# Patient Record
Sex: FEMALE | Race: WHITE
[De-identification: names, ages, dates, MRNs, and addresses within clinical notes are randomized per-mention and may not be internally consistent; named-entity substitution may affect disease eponyms.]

---

## 2017-07-03 ENCOUNTER — HOSPITAL ENCOUNTER (OUTPATIENT)
Dept: HOSPITAL 45 - C.LAB | Age: 52
Discharge: HOME | End: 2017-07-03
Attending: FAMILY MEDICINE
Payer: COMMERCIAL

## 2017-07-03 DIAGNOSIS — Z13.220: ICD-10-CM

## 2017-07-03 DIAGNOSIS — I10: Primary | ICD-10-CM

## 2017-07-03 LAB
ANION GAP SERPL CALC-SCNC: 5 MMOL/L (ref 3–11)
BUN SERPL-MCNC: 15 MG/DL (ref 7–18)
BUN/CREAT SERPL: 18.1 (ref 10–20)
CALCIUM SERPL-MCNC: 9 MG/DL (ref 8.5–10.1)
CHLORIDE SERPL-SCNC: 104 MMOL/L (ref 98–107)
CHOLEST/HDLC SERPL: 3 {RATIO}
CO2 SERPL-SCNC: 30 MMOL/L (ref 21–32)
CREAT SERPL-MCNC: 0.85 MG/DL (ref 0.6–1.2)
GLUCOSE SERPL-MCNC: 81 MG/DL (ref 70–99)
GLUCOSE UR QL: 67 MG/DL
KETONES UR QL STRIP: 108 MG/DL
NITRITE UR QL STRIP: 123 MG/DL (ref 0–150)
PH UR: 200 MG/DL (ref 0–200)
POTASSIUM SERPL-SCNC: 3.9 MMOL/L (ref 3.5–5.1)
SODIUM SERPL-SCNC: 139 MMOL/L (ref 136–145)
VERY LOW DENSITY LIPOPROT CALC: 25 MG/DL

## 2017-07-06 NOTE — CODING QUERY NO DIAGNOSIS
TREATMENT RENDERED WITHOUT A DIAGNOSIS                                                  



To promote full compliance with coding requirements relating to patient care, physician 
participation is requested in all cases of  uncertainty.  Please assist us with 
providing a diagnosis/symptom for the test(s) below:



A diagnosis/symptom was not documented on your Order.  A valid diagnosis/symptom is 
required to bill all insurances.



**Please remember that we are unable to code a diagnosis of rule out, probable, possible, 
questionable, or suspected.  



Tests that require a diagnosis:

DOS 7/3

* PRP      DIAGNOSIS:

* Lipids      DIAGNOSIS:





Provider Signature: _____________________________ Date: _________



Thank you  

Lisbeth Love

Health Information Management

Phone:  [phone]

Fax:  [phone]



Once completed, please kindly fax back to [phone]



For questions please call [phone]

## 2017-07-11 ENCOUNTER — HOSPITAL ENCOUNTER (OUTPATIENT)
Dept: HOSPITAL 45 - C.MAMM | Age: 52
Discharge: HOME | End: 2017-07-11
Attending: OBSTETRICS & GYNECOLOGY
Payer: COMMERCIAL

## 2017-07-11 DIAGNOSIS — Z12.31: Primary | ICD-10-CM

## 2017-07-12 NOTE — MAMMOGRAPHY REPORT
BILATERAL DIGITAL SCREENING MAMMOGRAM TOMOSYNTHESIS WITH CAD: 7/11/2017

CLINICAL HISTORY: Routine screening.  Patient has no complaints.  





TECHNIQUE:  Breast tomosynthesis in addition to standard 2D mammography was performed. Current study 
was also evaluated with a Computer Aided Detection (CAD) system.  



COMPARISON: Comparison is made to exams dated:  6/29/2016 mammogram, 6/25/2015 mammogram, 6/16/2015 m
ammogram, 6/11/2014 mammogram, 6/4/2013 mammogram, and 5/30/2012 mammogram - [facility]
nter.   



BREAST COMPOSITION:  The tissue of both breasts is heterogeneously dense, which may obscure small mas
ses.  



FINDINGS:  There is a 9 mm asymmetry in the lateral, middle to posterior right breast on the CC view.
  The corresponding tomosynthesis images are equivocal for a possible mass in this location.  Therefo
re, additional spot compression tomosynthesis views and possible ultrasound are recommended, although
 this could represent normal overlapping fibroglandular tissue.



There is a stable dominant 16 mm mass with associated coarse calcification in the central right breas
t.  A few benign rim calcifications and fluctuating nodularity in the left breast. No other suspiciou
s mass, architectural distortion or cluster of microcalcifications is seen.  



IMPRESSION:  ACR BI-RADS CATEGORY 0: INCOMPLETE EVALUATION:  NEED ADDITIONAL IMAGING EVALUATION

The 9 mm asymmetry in the lateral right breast needs additional evaluation.  

The patient will be called to schedule an appointment.  





Approximately 10% of breast cancers are not detected with mammography. A negative mammographic report
 should not delay biopsy if a clinically suggestive mass is present.



Diamond Browne M.D.          

ay/:7/11/2017 16:35:44  



Imaging Technologist: Selena KNOWLES(R)(M), [facility]

letter sent: Addl Imaging 0  

BI-RADS Code: ACR BI-RADS Category 0: Incomplete Evaluation:  Need Additional Imaging Evaluation

## 2017-07-27 ENCOUNTER — HOSPITAL ENCOUNTER (OUTPATIENT)
Dept: HOSPITAL 45 - C.MAMM | Age: 52
Discharge: HOME | End: 2017-07-27
Attending: FAMILY MEDICINE
Payer: COMMERCIAL

## 2017-07-27 DIAGNOSIS — N64.89: Primary | ICD-10-CM

## 2017-07-27 DIAGNOSIS — N60.01: ICD-10-CM

## 2017-07-27 NOTE — MAMMOGRAPHY REPORT
UNILATERAL RIGHT DIGITAL DIAGNOSTIC MAMMOGRAM TOMOSYNTHESIS AND TARGETED RIGHT ULTRASOUND: 7/27/2017

CLINICAL HISTORY: Callback from screening mammogram for right breast asymmetry.  





TECHNIQUE:  Breast tomosynthesis in addition to standard 2D mammography was performed.  Spot compress
ion right CC and MLO 2-D and tomosynthesis images were obtained.



COMPARISON: Comparison is made to exams dated:  7/11/2017 mammogram, 6/29/2016 mammogram, 6/16/2015 m
ammogram, 6/11/2014 mammogram, 6/4/2013 mammogram, and 5/30/2012 mammogram - [facility]
nter.   



BREAST COMPOSITION:  The tissue of the right breast is heterogeneously dense, which may obscure small
 masses.  



FINDINGS:  The previously described 9 mm asymmetry seen within the right lateral breast on the cc vie
w effaces on the spot compression view, without a suspicious mass or architectural distortion seen in
 this region on the tomosynthesis images.



Targeted ultrasound was performed of the right lateral breast in the region of the mammographic asymm
etry.  Multiple anechoic benign cysts and cyst clusters were seen in the right lateral breast during 
the ultrasound exam, including a 3 x 4 mm simple cyst in the right breast 8:00, 8 cm from the nipple,
 an 8 x 9 mm cyst in the right breast at 9:30, 6 cm from the nipple, a 3 mm cyst in the right breast 
at 11:00, 4 cm from the nipple, and an 11 x 7 mm cluster of cysts in the right 11:00 periareolar don
st.  The cyst in the right 9:30 breast may correspond with the mammographic asymmetry.  Alternatively
, the asymmetry could represent normal fibroglandular tissue.



IMPRESSION:  ACR BI-RADS CATEGORY 2: BENIGN, TARGETED ULTRASOUND ACR BI-RADS CATEGORY 2: BENIGN 

The right lateral breast asymmetry effaces on the additional views.  Multiple benign cysts and cyst c
lusters were seen within the right lateral breast on the ultrasound exam.  One 9 mm benign cyst in th
e right breast at 9:30 may correspond with the mammographic asymmetry.  Alternatively, the asymmetry 
may represent normal fibroglandular tissue.  There is no mammographic or targeted sonographic evidenc
e of malignancy. A 1 year screening mammogram is recommended.  The patient has been verbally notified
 of the results.  





Approximately 10% of breast cancers are not detected with mammography. A negative mammographic report
 should not delay biopsy if a clinically suggestive mass is present.



Lovely Arredondo M.D.          

ah/:7/27/2017 08:41:59  



Imaging Technologist: Rupinder KNOWLES(SEBAS)(CHRIS), [facility]

letter sent: Normal 1/2  

BI-RADS Code: ACR BI-RADS Category 2: Benign  Ultrasound BI-RADS: ACR BI-RADS Category 2: Benign

## 2017-08-29 ENCOUNTER — HOSPITAL ENCOUNTER (OUTPATIENT)
Dept: HOSPITAL 45 - C.CPL | Age: 52
Discharge: HOME | End: 2017-08-29
Attending: SURGERY
Payer: COMMERCIAL

## 2017-08-29 DIAGNOSIS — R22.9: Primary | ICD-10-CM

## 2017-09-26 ENCOUNTER — HOSPITAL ENCOUNTER (OUTPATIENT)
Dept: HOSPITAL 45 - X.SURG | Age: 52
Discharge: HOME | End: 2017-09-26
Attending: SURGERY
Payer: COMMERCIAL

## 2017-09-26 VITALS
HEIGHT: 64.02 IN | HEIGHT: 64.02 IN | WEIGHT: 131.29 LBS | BODY MASS INDEX: 22.41 KG/M2 | BODY MASS INDEX: 22.41 KG/M2 | WEIGHT: 131.29 LBS

## 2017-09-26 VITALS — OXYGEN SATURATION: 100 % | HEART RATE: 75 BPM | SYSTOLIC BLOOD PRESSURE: 125 MMHG | DIASTOLIC BLOOD PRESSURE: 57 MMHG

## 2017-09-26 VITALS — TEMPERATURE: 97.34 F

## 2017-09-26 DIAGNOSIS — C82.92: Primary | ICD-10-CM

## 2017-09-26 NOTE — [facility] OPERATIVE REPORT
Operative Report


Operative Date


Sep 26, 2017.





Pre-Operative Diagnosis





Chest Wall Mass





Post-Operative Diagnosis





Sebaceous Cyst





Procedure(s) Performed





Excision of Subcutaneous Mass





Surgeon


Dr. KELLE Bauer





Assistant Surgeon(s)


ALEKSEY Roth PA-C





Estimated Blood Loss


10 cc





Findings


4 cm mass- likely seb cyst, inflammation





Specimens





A.  Chest Wall Mass





Anesthesia


local/ sedation





Complication(s)


None





Disposition


Recovery Room / PACU


I attest to the content of the Intraoperative Record and any orders documented 

therein.  Any exceptions are noted below.

## 2017-09-26 NOTE — DISCHARGE INSTRUCTIONS-[facility]
Discharge Instructions


Date of Service


Sep 26, 2017.





Visit


Reason for Visit:  Chest Wall Mass





Discharge


Discharge Diagnosis / Problem:  soft tissue mass





Discharge Goals


Goal(s):  Decrease discomfort, Improve function, Improve disease control





Activity Recommendations


Activity Limitations:  as noted below


Lifting Limitations:  no more than 25 pounds


Exercise/Sports Limitations:  until after follow-up appointment


May Resume Sexual Activity:  when tolerated


Shower/Bathe:  keep incision dry (may shower over incision in 2 days- Thur 9/28)


Driving or Machine Use:  resume 1 day after discharge





SPECIAL CARE INSTRUCTIONS:





*  Cover incisions and change daily for comfort/drainage.





*  May use ibuprofen for pain as tolerated.





*  Expect some swelling and bruising.





Call your doctor if:





*  Temperature above 101 degrees





*  Pain not relieved by pain medicine ordered





*  There is increased drainage or redness from any incision





*  You have any unanswered questions or concerns [phone].








FOLLOW UP VISIT:





If not already scheduled, please call the office for a follow-up visit.





for 2 weeks- check up





OFFICE PHONE NUMBER:





Dr. Bauer Office Phone Number:  [phone]








Anesthesia


.





Post Anesthesia Instructions:





If you have had General Anesthesia or IV Sedation:





*  Do not drive today.


*  Resume driving when surgeon permits.


*  Do not make important decisions or sign legal documents today.


*  Call surgeon for:





   1.  Temperature elevations greater than 101 degrees F.


   2.  Uncontrollable pain.


   3.  Excessive bleeding.


   4.  Persistent nausea and vomiting.


   5.  Medication intolerance (nausea, vomiting or rash).





*  For nausea and vomiting use only clear liquids such as: tea, soda, bouillon 

until nausea subsides, then gradually increase diet as tolerated.





*  If you have any concerns or questions, call your surgeon's office.  If 

physician is unavailable and it is an emergency, call 911 or go to the nearest 

emergency room.





.





Diet Recommendations


Home Diet:  resume previous diet





Pending Studies


Studies pending at discharge:  no





Medical Emergencies








.


Who to Call and When:





Medical Emergencies:  If at any time you feel your situation is an emergency, 

please call 911 immediately.





.





Non-Emergent Contact


Non-Emergency issues call your:  Primary Care Provider, Surgeon





.


.








"Provider Documentation" section prepared by Leandro Bauer.








.

## 2017-09-26 NOTE — ANESTHESIA PROGRESS NT - [facility]
Anesthesia Post Op Note


Date & Time


Sep 26, 2017 at 08:18





Vital Signs


Pain Intensity:  2





Vital Signs Past 12 Hours








  Date Time  Temp Pulse Resp B/P (MAP) Pulse Ox O2 Delivery O2 Flow Rate FiO2


 


9/26/17 08:08  75 18 125/57 (79) 100 Room Air  


 


9/26/17 07:41 36.3 83 16 102/66 (78) 99 Room Air  


 


9/26/17 06:37 36.4 85 22 113/76 (88) 98 Room Air  











Notes


Mental Status:  alert / awake / arousable, participated in evaluation


Pt Amnestic to Procedure:  Yes


Nausea / Vomiting:  adequately controlled


Pain:  adequately controlled


Airway Patency, RR, SpO2:  stable & adequate


BP & HR:  stable & adequate


Hydration State:  stable & adequate


Anesthetic Complications:  no major complications apparent

## 2017-09-26 NOTE — ANESTHESIOLOGY PROGRESS NOTE
Anesthesia Post Op Note


Date & Time


Sep 26, 2017 at 08:04





Vital Signs


Pain Intensity:  0





Vital Signs Past 12 Hours








  Date Time  Temp Pulse Resp B/P (MAP) Pulse Ox O2 Delivery O2 Flow Rate FiO2


 


9/26/17 07:41 36.3 83 16 102/66 (78) 99 Room Air  


 


9/26/17 06:37 36.4 85 22 113/76 (88) 98 Room Air  











Notes


Mental Status:  alert / awake / arousable, participated in evaluation


Pt Amnestic to Procedure:  Yes


Nausea / Vomiting:  adequately controlled


Pain:  adequately controlled


Airway Patency, RR, SpO2:  stable & adequate


BP & HR:  stable & adequate


Hydration State:  stable & adequate


Anesthetic Complications:  no major complications apparent

## 2017-09-26 NOTE — OPERATIVE REPORT
DATE OF OPERATION:  09/26/2017

 

NAME OF OPERATION:  Excision of 4 cm subcutaneous mass.

 

PREOPERATIVE DIAGNOSIS:  Sebaceous cyst versus lipoma.

 

POSTOPERATIVE DIAGNOSIS:  Sebaceous cyst.

 

STAFF SURGEON:  Dr. Bauer.

 

ASSISTANT:  Barbara Roth PA-C.

 

ANESTHESIA:  Local with sedation.

 

PROCEDURE:  The patient was brought in the operating room and placed on the

operating table in supine position.  Her lower chest, upper abdomen was

prepped and draped in usual fashion.  She had an area of mass which was

approximately 4 x 2 cm palpable with some mild erythema.  Elliptical incision

was made carrying dissection down identifying what appeared to be a sebaceous

cyst, but there was surrounding inflammation.  This was excised down into the

subcutaneous tissue.  After appropriate hemostasis, the deep tissue was

reapproximated using 3-0 Vicryl suture, then the skin reapproximated using

subcuticular 4-0 Monocryl and Steri-Strips.  Dressing applied and patient

transferred to recovery room in stable condition.

 

 

I attest to the content of the Intraoperative Record and any orders documented therein. Any exception
s are noted below.

## 2017-10-17 ENCOUNTER — HOSPITAL ENCOUNTER (OUTPATIENT)
Dept: HOSPITAL 45 - C.LAB | Age: 52
Discharge: HOME | End: 2017-10-17
Attending: INTERNAL MEDICINE
Payer: COMMERCIAL

## 2017-10-17 DIAGNOSIS — C82.19: Primary | ICD-10-CM

## 2017-10-17 LAB
ALBUMIN/GLOB SERPL: 1.1 {RATIO} (ref 0.9–2)
ALP SERPL-CCNC: 88 U/L (ref 45–117)
ALT SERPL-CCNC: 23 U/L (ref 12–78)
ANION GAP SERPL CALC-SCNC: 6 MMOL/L (ref 3–11)
AST SERPL-CCNC: 16 U/L (ref 15–37)
BASOPHILS # BLD: 0.04 K/UL (ref 0–0.2)
BASOPHILS NFR BLD: 0.5 %
BUN SERPL-MCNC: 11 MG/DL (ref 7–18)
BUN/CREAT SERPL: 14.5 (ref 10–20)
CALCIUM SERPL-MCNC: 8.7 MG/DL (ref 8.5–10.1)
CHLORIDE SERPL-SCNC: 104 MMOL/L (ref 98–107)
CO2 SERPL-SCNC: 30 MMOL/L (ref 21–32)
COMPLETE: YES
CREAT SERPL-MCNC: 0.77 MG/DL (ref 0.6–1.2)
EOSINOPHIL NFR BLD AUTO: 276 K/UL (ref 130–400)
GLOBULIN SER-MCNC: 3.4 GM/DL (ref 2.5–4)
GLUCOSE SERPL-MCNC: 95 MG/DL (ref 70–99)
HCT VFR BLD CALC: 39.6 % (ref 37–47)
IG%: 0.1 %
IMM GRANULOCYTES NFR BLD AUTO: 29.1 %
LYMPHOCYTES # BLD: 2.24 K/UL (ref 1.2–3.4)
MCH RBC QN AUTO: 30.8 PG (ref 25–34)
MCHC RBC AUTO-ENTMCNC: 33.6 G/DL (ref 32–36)
MCV RBC AUTO: 91.7 FL (ref 80–100)
MONOCYTES NFR BLD: 10.5 %
NEUTROPHILS # BLD AUTO: 3 %
NEUTROPHILS NFR BLD AUTO: 56.8 %
PMV BLD AUTO: 9.5 FL (ref 7.4–10.4)
POTASSIUM SERPL-SCNC: 3.6 MMOL/L (ref 3.5–5.1)
RBC # BLD AUTO: 4.32 M/UL (ref 4.2–5.4)
SODIUM SERPL-SCNC: 140 MMOL/L (ref 136–145)
WBC # BLD AUTO: 7.69 K/UL (ref 4.8–10.8)

## 2017-10-25 ENCOUNTER — HOSPITAL ENCOUNTER (OUTPATIENT)
Dept: HOSPITAL 45 - C.PET | Age: 52
Discharge: HOME | End: 2017-10-25
Attending: INTERNAL MEDICINE
Payer: COMMERCIAL

## 2017-10-25 DIAGNOSIS — C82.19: Primary | ICD-10-CM

## 2017-10-25 NOTE — DIAGNOSTIC IMAGING REPORT
WHOLE BODY PET/CT



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



COMPARISON STUDY: No priors.



TECHNIQUE: One hour following the IV administration of 15.99 mCi of F-18 FDG,

PET/CT examination was performed from the orbital meatal line through the feet.

Noncontrast CT is performed for the purposes of anatomic correlation and

attenuation correction. Note that this does not reflect a diagnostic CT

examination. Images were reviewed on a separate [employer] independent workstation.

Fused images were obtained. Standard uptake values reported are maximum values

within the region of interest expressed in gm/mL.



FINDINGS:



PET FINDINGS:



Head and neck: There is expected physiologic activity within the visualized

brain parenchyma at the skull base and the salivary glands. Low level pharyngeal

activity as well as nonspecific activity localizing to the left thyroid

cartilage in image #38 is likely within physiologic limits. There are no

pathologically enlarged or FDG avid cervical lymph nodes.



Thorax: Evaluation of the thorax demonstrates expected physiologic myocardial

activity. There is a 1.6 cm calcification containing nodule in the right breast

seen on image #98. The appearance is typical for a fibroadenoma. This was not

demonstrably FDG avid. There are no pathologically enlarged or FDG avid

mediastinal, hilar, or axillary lymph nodes. There is mild dermal thickening and

induration involving the ventral midline lower chest/upper abdominal wall, which

extends to a depth of 8 mm. This is located just below the xiphoid of the

sternum on axial image #115. This is FDG avid with a maximum SUV of 2.6.



Abdomen and pelvis: There is expected activity within the liver, spleen,

kidneys, renal collecting system, and bladder. Low-level bowel activity is

likely within physical limits. There is no abdominal or pelvic lymphadenopathy.

The spleen is normal in size, measuring 10 cm in length.



Lower extremities: There is no inguinal lymphadenopathy. No abnormal dermal

thickening or FDG activity seen in the legs.



Unenhanced CT images: Visualized brain parenchyma at the skull base is normal in

appearance. The orbital contents are normal as visualized. The imaged paranasal

sinuses are clear. There is a right mastoid effusion. The salivary and thyroid

glands are within normal limits. The thoracic aorta is normal in caliber. The

heart is normal in size and without pericardial effusion. The lungs and pleural

spaces are clear. The gallbladder surgically absent. The unenhanced liver,

spleen, adrenal glands, pancreas, and kidneys are grossly normal. The abdominal

aorta is normal in course and caliber. There is no bowel obstruction. There is

no intraperitoneal free air or abdominal ascites. The bladder is normal in

appearance. The uterus is surgically absent. No adnexal lesion is seen. The

lower extremity soft tissues are normal in appearance. Chronic posttraumatic

deformity is seen in the left ischio. No destructive bony lesion is seen.





IMPRESSION:



1. There is dermal thickening with associated FDG activity seen involving the

soft tissues in the ventral midline lower chest/upper abdomen. This is

consistent with the reported history of follicular lymphoma of the dermis.



2. No additional sites of dermal disease are identified.



3. There are no pathologically enlarged lymph nodes seen. The spleen is normal

in size.



4. The lungs are clear.



5. Additional findings as above.







Electronically signed by:  Gautam Vasquez M.D.

10/25/2017 9:41 AM



Dictated Date/Time:  10/25/2017 9:22 AM

## 2017-12-26 ENCOUNTER — HOSPITAL ENCOUNTER (OUTPATIENT)
Dept: HOSPITAL 45 - C.LAB | Age: 52
Discharge: HOME | End: 2017-12-26
Attending: INTERNAL MEDICINE
Payer: COMMERCIAL

## 2017-12-26 DIAGNOSIS — C82.19: Primary | ICD-10-CM

## 2017-12-26 LAB
ALBUMIN SERPL-MCNC: 3.5 GM/DL (ref 3.4–5)
ALP SERPL-CCNC: 80 U/L (ref 45–117)
ALT SERPL-CCNC: 24 U/L (ref 12–78)
AST SERPL-CCNC: 16 U/L (ref 15–37)
BASOPHILS # BLD: 0.04 K/UL (ref 0–0.2)
BASOPHILS NFR BLD: 0.7 %
BUN SERPL-MCNC: 19 MG/DL (ref 7–18)
CALCIUM SERPL-MCNC: 8.6 MG/DL (ref 8.5–10.1)
CO2 SERPL-SCNC: 30 MMOL/L (ref 21–32)
CREAT SERPL-MCNC: 0.79 MG/DL (ref 0.6–1.2)
EOS ABS #: 0.16 K/UL (ref 0–0.5)
EOSINOPHIL NFR BLD AUTO: 271 K/UL (ref 130–400)
GLUCOSE SERPL-MCNC: 95 MG/DL (ref 70–99)
HCT VFR BLD CALC: 41.7 % (ref 37–47)
HGB BLD-MCNC: 13.8 G/DL (ref 12–16)
IG#: 0.02 K/UL (ref 0–0.02)
IMM GRANULOCYTES NFR BLD AUTO: 26.8 %
LYMPHOCYTES # BLD: 1.45 K/UL (ref 1.2–3.4)
MCH RBC QN AUTO: 30.7 PG (ref 25–34)
MCHC RBC AUTO-ENTMCNC: 33.1 G/DL (ref 32–36)
MCV RBC AUTO: 92.7 FL (ref 80–100)
MONO ABS #: 0.65 K/UL (ref 0.11–0.59)
MONOCYTES NFR BLD: 12 %
NEUT ABS #: 3.09 K/UL (ref 1.4–6.5)
NEUTROPHILS # BLD AUTO: 3 %
NEUTROPHILS NFR BLD AUTO: 57.1 %
PMV BLD AUTO: 10.1 FL (ref 7.4–10.4)
POTASSIUM SERPL-SCNC: 3.8 MMOL/L (ref 3.5–5.1)
PROT SERPL-MCNC: 7.1 GM/DL (ref 6.4–8.2)
RED CELL DISTRIBUTION WIDTH CV: 12.4 % (ref 11.5–14.5)
RED CELL DISTRIBUTION WIDTH SD: 42.4 FL (ref 36.4–46.3)
SODIUM SERPL-SCNC: 139 MMOL/L (ref 136–145)
WBC # BLD AUTO: 5.41 K/UL (ref 4.8–10.8)

## 2018-01-23 ENCOUNTER — HOSPITAL ENCOUNTER (OUTPATIENT)
Dept: HOSPITAL 45 - C.ONC | Age: 53
Discharge: HOME | End: 2018-01-23
Attending: PHYSICIAN ASSISTANT
Payer: COMMERCIAL

## 2018-01-23 VITALS
HEART RATE: 96 BPM | DIASTOLIC BLOOD PRESSURE: 70 MMHG | OXYGEN SATURATION: 97 % | SYSTOLIC BLOOD PRESSURE: 115 MMHG | TEMPERATURE: 97.88 F

## 2018-01-23 DIAGNOSIS — Z85.72: ICD-10-CM

## 2018-01-23 DIAGNOSIS — Z92.3: ICD-10-CM

## 2018-01-23 DIAGNOSIS — Z08: Primary | ICD-10-CM

## 2018-01-23 NOTE — RADIATION ONCOLOGY FOLLOW-UP
Radiation Oncology Follow-Up


Date of Visit


Jan 23, 2018.





Reason For Visit


One-month follow-up





Radiation Completion Date


12/12/17





Diagnosis





(1) Cutaneous B-cell lymphoma


Status:  Resolved        Onset Date:  9/26/2017


Location:  upper abdominal wall


Stage:  l


Permanent Comment:  Development of a mass of the central lower chest


Increase in size and development of overlying redness August 2016


Status post partial excision


Status post wide excision 09/26/2017


Non-Hodgkin's B-cell lymphoma, follicular low-grade


Status post completion of radiation therapy to the abdominal wall 12/12/2017 

received 3600 cGy  Last Edited By: Mariella Sims on Dec 21, 2017 11:39





History of Present Illness


Ms. Moser  had a lump in her midline of her chest for several years that was 

slowly grown over time.  She states that the mass has became more red over 

time.  The patient was evaluated by Dr. Bauer in August 2017 who recommended an 

excisional biopsy.  The patient was brought to the operating room on 09/26/2017 

underwent an excisional biopsy by Dr. Bauer; Dr. Bauer described the mass as 

measuring 4 x 2 cm and with some mild erythema.  Pathology revealed primary 

cutaneous low-grade B cell follicular lymphoma..  The patient was seen by Dr. Tee Finley from medical oncology.  The patient then had a PET/CT scan 

completed on 10/25/2017 which revealed dermal thickening with associated FDG 

activity involving the soft tissues in the ventral midline lower chest/upper 

abdomen but no other evidence of additional sites of disease.  The patient was 

staged as stage I primary cutaneous low-grade lymphoma.  Dr. Finley 

recommended consideration of primary radiation therapy followed by close 

surveillance and monitoring.  We are now seeing the patient in consultation 

discuss the role of radiation therapy.





Status post completion of radiation therapy 12/12/2017.  She received 3600 cGy





Interim History


She's been doing well over the past month.  There was mild erythema with the 

maculopapular rash at the end of treatment.  She use the natural care gel.  

Following her treatment she went on a vacation.  She diligently used sunscreen.

  She had no difficulty with skin irritation.  Since returning home the skin 

shows only a slight darker discoloration.  She has no masses and no tenderness.





Allergies


Coded Allergies:  


     Clindamycin (Verified  Allergy, Intermediate, HIVES, 9/26/17)


     Macrolides and Ketolides (Verified  Allergy, Intermediate, HIVES, HEART 

PALPITAIONS, 9/26/17)


 ERYTHROMYCIN, HIVES & HEART PALPITATIONS


     Sulfa Antibiotics (Verified  Allergy, Intermediate, HIVES, HEART 

PALPITATIONS, 9/26/17)


 BACTRIM-HIVES, HEART PALPITATIONS


     Sulfamethoxazole (Verified  Allergy, Intermediate, RASH, 9/26/17)


     Ciprofloxacin (Verified  Allergy, Unknown, ., 9/26/17)


 pt states reddened streak up her arm. MD told her allergic


 to IV form


     Erythromycin (Verified  Allergy, Unknown, RASH, 9/26/17)


     Latex (Verified  Allergy, Unknown, RASH, 9/26/17)


     Levocetirizine (Verified  Allergy, Unknown, itchiness/ rash, 9/26/17)





Home Medications


Scheduled


Ascorbic Acid (Vitamin C), 1 TAB PO QAM


Biotin (Biotin 5000), 1 CAP PO QAM


Calcium/Vitamin D (Os-Cesar 500 Plus D), 1 TAB PO QAM


Estrogens, Conjugated (Premarin), 0.625 MG PO QAM


Fexofenadine-Pseudoephedrine (Allegra-D 24 Hour Allergy), 1 TAB PO QAM


Fish Oil (Peru-3), 1 CAP PO QAM


Losartan Potassium & Hydrochlo (Hyzaar), 1 TAB PO QAM


Multivitamin (Multivitamin), 1 TAB PO QAM


Specialty Vitamins Products (Collagen Ultra), 1 CAP PO QAM


[Vitamin B12], 2,000 MG SL QAM





Review of Systems


Gastrointestinal:  


   Symptoms:  WNL


Oral:  


   Symptoms:  No Problems


Respiratory:  


   Symptoms:  WNL


Urinary:  


   Symptoms:  WNL


Skin:  


   Symptoms:  No Problems


   Other Skin Symptoms:  skin discoloration at site


Additional Notes:


She completed a distress management report and answered "no" to all questions.





Physical Exam





Vital Signs








  Date Time  Temp Pulse Resp B/P (MAP) Pulse Ox O2 Delivery O2 Flow Rate FiO2


 


1/23/18 15:09 36.6 96 20 115/70 97   








Fatigue:  None


General Appearance:  no apparent distress


Abdomen:  non tender, soft, + pertinent finding (there is an oval area of 

hyperpigmentation around the well-healed incision.  There is no erythema or 

edema.)





Pain Management


Patient Reports Pain:  Yes


Side:  Left


Pain Location:  tendonitis of arm, sortisone inj last week


Patient Preferred Pain Scale:  0 - 10


Initial Pain Intensity:  1.0


Pain Management Plan


This requires no pain management.





Laboratory


Laboratory Results:  not applicable





Pathology


Pathology Results:  not applicable





Imaging


Imaging Studies:  not applicable





Assessment & Plan


Patient was also seen and examined by Dr. Mcginnis.  She'll continue to use 

sunscreen when needed.  Continue follow-up with Dr. Finley.  A follow-up 

appointment with our office was not given.  She may call if she has any 

questions or concerns would be happy to see her.





Assessment & Plan (Attending)


ADDENDUM: I agree with note created by Mariella Sims PA-C. I reviewed the 

patient's chart and information with her.  I have examined and evaluated the 

patient. I reviewed relevant clinical information and answered the patient's and

/or family's questions. 





Total Time


In Follow-Up


I suspect 15 minutes speaking to the patient and performing examination.  I 

spent 15 minutes reviewing information in completing this note.





Total Time (Attending)


In Follow-Up


I spent 15 minutes examining and counseling the patient. 





Copy To


Monika Blackburn M.D.; Leandro Bauer M.D.; Tee Finley MD

## 2018-07-24 ENCOUNTER — HOSPITAL ENCOUNTER (OUTPATIENT)
Dept: HOSPITAL 45 - C.LAB | Age: 53
Discharge: HOME | End: 2018-07-24
Attending: FAMILY MEDICINE
Payer: COMMERCIAL

## 2018-07-24 DIAGNOSIS — Z00.00: Primary | ICD-10-CM

## 2018-07-24 LAB
BUN SERPL-MCNC: 18 MG/DL (ref 7–18)
CALCIUM SERPL-MCNC: 8.7 MG/DL (ref 8.5–10.1)
CO2 SERPL-SCNC: 28 MMOL/L (ref 21–32)
CREAT SERPL-MCNC: 0.8 MG/DL (ref 0.6–1.2)
GLUCOSE SERPL-MCNC: 86 MG/DL (ref 70–99)
KETONES UR QL STRIP: 122 MG/DL
PH UR: 213 MG/DL (ref 0–200)
POTASSIUM SERPL-SCNC: 3.9 MMOL/L (ref 3.5–5.1)
SODIUM SERPL-SCNC: 141 MMOL/L (ref 136–145)